# Patient Record
Sex: FEMALE | Race: WHITE | NOT HISPANIC OR LATINO | URBAN - METROPOLITAN AREA
[De-identification: names, ages, dates, MRNs, and addresses within clinical notes are randomized per-mention and may not be internally consistent; named-entity substitution may affect disease eponyms.]

---

## 2017-11-08 ENCOUNTER — EMERGENCY (EMERGENCY)
Facility: HOSPITAL | Age: 27
LOS: 1 days | Discharge: HOME | End: 2017-11-08
Attending: EMERGENCY MEDICINE | Admitting: EMERGENCY MEDICINE
Payer: COMMERCIAL

## 2017-11-08 VITALS
HEART RATE: 82 BPM | RESPIRATION RATE: 18 BRPM | DIASTOLIC BLOOD PRESSURE: 64 MMHG | TEMPERATURE: 98 F | OXYGEN SATURATION: 99 % | SYSTOLIC BLOOD PRESSURE: 121 MMHG

## 2017-11-08 VITALS
DIASTOLIC BLOOD PRESSURE: 78 MMHG | RESPIRATION RATE: 18 BRPM | TEMPERATURE: 98 F | OXYGEN SATURATION: 99 % | WEIGHT: 149.91 LBS | SYSTOLIC BLOOD PRESSURE: 119 MMHG | HEART RATE: 93 BPM | HEIGHT: 64 IN

## 2017-11-08 DIAGNOSIS — R87.619 UNSPECIFIED ABNORMAL CYTOLOGICAL FINDINGS IN SPECIMENS FROM CERVIX UTERI: Chronic | ICD-10-CM

## 2017-11-08 DIAGNOSIS — I62.00 NONTRAUMATIC SUBDURAL HEMORRHAGE, UNSPECIFIED: ICD-10-CM

## 2017-11-08 DIAGNOSIS — Z79.899 OTHER LONG TERM (CURRENT) DRUG THERAPY: ICD-10-CM

## 2017-11-08 DIAGNOSIS — R42 DIZZINESS AND GIDDINESS: ICD-10-CM

## 2017-11-08 DIAGNOSIS — Z88.0 ALLERGY STATUS TO PENICILLIN: ICD-10-CM

## 2017-11-08 LAB
ALBUMIN SERPL ELPH-MCNC: 5.2 G/DL — HIGH (ref 3.3–5)
ALP SERPL-CCNC: 47 U/L — SIGNIFICANT CHANGE UP (ref 40–120)
ALT FLD-CCNC: 23 U/L — SIGNIFICANT CHANGE UP (ref 10–45)
ANION GAP SERPL CALC-SCNC: 16 MMOL/L — SIGNIFICANT CHANGE UP (ref 5–17)
APTT BLD: 27.1 SEC — LOW (ref 27.5–37.4)
AST SERPL-CCNC: 36 U/L — SIGNIFICANT CHANGE UP (ref 10–40)
BASOPHILS NFR BLD AUTO: 0.3 % — SIGNIFICANT CHANGE UP (ref 0–2)
BILIRUB SERPL-MCNC: 0.4 MG/DL — SIGNIFICANT CHANGE UP (ref 0.2–1.2)
BUN SERPL-MCNC: 16 MG/DL — SIGNIFICANT CHANGE UP (ref 7–23)
CALCIUM SERPL-MCNC: 10.2 MG/DL — SIGNIFICANT CHANGE UP (ref 8.4–10.5)
CHLORIDE SERPL-SCNC: 98 MMOL/L — SIGNIFICANT CHANGE UP (ref 96–108)
CO2 SERPL-SCNC: 25 MMOL/L — SIGNIFICANT CHANGE UP (ref 22–31)
CREAT SERPL-MCNC: 1.06 MG/DL — SIGNIFICANT CHANGE UP (ref 0.5–1.3)
EOSINOPHIL NFR BLD AUTO: 0.6 % — SIGNIFICANT CHANGE UP (ref 0–6)
GLUCOSE SERPL-MCNC: 104 MG/DL — HIGH (ref 70–99)
HCT VFR BLD CALC: 37 % — SIGNIFICANT CHANGE UP (ref 34.5–45)
HGB BLD-MCNC: 12.1 G/DL — SIGNIFICANT CHANGE UP (ref 11.5–15.5)
INR BLD: 1.05 — SIGNIFICANT CHANGE UP (ref 0.88–1.16)
LYMPHOCYTES # BLD AUTO: 21.9 % — SIGNIFICANT CHANGE UP (ref 13–44)
MCHC RBC-ENTMCNC: 20.6 PG — LOW (ref 27–34)
MCHC RBC-ENTMCNC: 32.7 G/DL — SIGNIFICANT CHANGE UP (ref 32–36)
MCV RBC AUTO: 62.9 FL — LOW (ref 80–100)
MONOCYTES NFR BLD AUTO: 6.6 % — SIGNIFICANT CHANGE UP (ref 2–14)
NEUTROPHILS NFR BLD AUTO: 70.6 % — SIGNIFICANT CHANGE UP (ref 43–77)
PLATELET # BLD AUTO: 217 K/UL — SIGNIFICANT CHANGE UP (ref 150–400)
POTASSIUM SERPL-MCNC: 3.9 MMOL/L — SIGNIFICANT CHANGE UP (ref 3.5–5.3)
POTASSIUM SERPL-SCNC: 3.9 MMOL/L — SIGNIFICANT CHANGE UP (ref 3.5–5.3)
PROT SERPL-MCNC: 8.5 G/DL — HIGH (ref 6–8.3)
PROTHROM AB SERPL-ACNC: 11.7 SEC — SIGNIFICANT CHANGE UP (ref 9.8–12.7)
RBC # BLD: 5.88 M/UL — HIGH (ref 3.8–5.2)
RBC # FLD: 16.7 % — SIGNIFICANT CHANGE UP (ref 10.3–16.9)
SODIUM SERPL-SCNC: 139 MMOL/L — SIGNIFICANT CHANGE UP (ref 135–145)
WBC # BLD: 6.5 K/UL — SIGNIFICANT CHANGE UP (ref 3.8–10.5)
WBC # FLD AUTO: 6.5 K/UL — SIGNIFICANT CHANGE UP (ref 3.8–10.5)

## 2017-11-08 PROCEDURE — 99283 EMERGENCY DEPT VISIT LOW MDM: CPT

## 2017-11-08 PROCEDURE — 84702 CHORIONIC GONADOTROPIN TEST: CPT

## 2017-11-08 PROCEDURE — 85610 PROTHROMBIN TIME: CPT

## 2017-11-08 PROCEDURE — 80053 COMPREHEN METABOLIC PANEL: CPT

## 2017-11-08 PROCEDURE — 70450 CT HEAD/BRAIN W/O DYE: CPT | Mod: 26

## 2017-11-08 PROCEDURE — 85730 THROMBOPLASTIN TIME PARTIAL: CPT

## 2017-11-08 PROCEDURE — 36415 COLL VENOUS BLD VENIPUNCTURE: CPT

## 2017-11-08 PROCEDURE — 85025 COMPLETE CBC W/AUTO DIFF WBC: CPT

## 2017-11-08 PROCEDURE — 86850 RBC ANTIBODY SCREEN: CPT

## 2017-11-08 PROCEDURE — 70450 CT HEAD/BRAIN W/O DYE: CPT

## 2017-11-08 PROCEDURE — 99291 CRITICAL CARE FIRST HOUR: CPT | Mod: 25

## 2017-11-08 PROCEDURE — 99284 EMERGENCY DEPT VISIT MOD MDM: CPT | Mod: 25

## 2017-11-08 PROCEDURE — 86900 BLOOD TYPING SEROLOGIC ABO: CPT

## 2017-11-08 PROCEDURE — 86901 BLOOD TYPING SEROLOGIC RH(D): CPT

## 2017-11-08 PROCEDURE — 93010 ELECTROCARDIOGRAM REPORT: CPT

## 2017-11-08 RX ORDER — SENNA PLUS 8.6 MG/1
2 TABLET ORAL AT BEDTIME
Qty: 0 | Refills: 0 | Status: DISCONTINUED | OUTPATIENT
Start: 2017-11-08 | End: 2017-11-12

## 2017-11-08 RX ORDER — DOCUSATE SODIUM 100 MG
100 CAPSULE ORAL THREE TIMES A DAY
Qty: 0 | Refills: 0 | Status: DISCONTINUED | OUTPATIENT
Start: 2017-11-08 | End: 2017-11-12

## 2017-11-08 RX ORDER — BUPROPION HYDROCHLORIDE 150 MG/1
300 TABLET, EXTENDED RELEASE ORAL DAILY
Qty: 0 | Refills: 0 | Status: DISCONTINUED | OUTPATIENT
Start: 2017-11-08 | End: 2017-11-12

## 2017-11-08 RX ORDER — SODIUM CHLORIDE 9 MG/ML
1000 INJECTION INTRAMUSCULAR; INTRAVENOUS; SUBCUTANEOUS
Qty: 0 | Refills: 0 | Status: DISCONTINUED | OUTPATIENT
Start: 2017-11-08 | End: 2017-11-12

## 2017-11-08 RX ORDER — BUPROPION HYDROCHLORIDE 150 MG/1
1 TABLET, EXTENDED RELEASE ORAL
Qty: 0 | Refills: 0 | COMMUNITY

## 2017-11-08 NOTE — ED ADULT NURSE NOTE - OBJECTIVE STATEMENT
Patient sent in after abnormal brain MRI. Patient states she has been feeling tingling in her extremities after recent concussion. Patient moving all extremities, no facial droop, pupils 3mm PEERLA. Patient placed on cardiac monitor, EKG performed. Will continue to monitor patient.

## 2017-11-08 NOTE — H&P ADULT - NSHPREVIEWOFSYSTEMS_GEN_ALL_CORE
CV:  RRR  Pulm: Lungs CTAB  Respirations regular and unlabored NAD  PV: + peripheal puse warm to touch + capillary refill  GI: Abdomen round soft +BS non tender  : Voiding without difficulty  Skin: warm and dry  Neuro A&OX3 Cranial nerves intact  COLE 5/5 no drift or dysmetria

## 2017-11-08 NOTE — ED PROVIDER NOTE - MEDICAL DECISION MAKING DETAILS
here w/ known subdural, worsened on repeat MRI yesterday, sent in for nsurg evaluation. here w/ known subdural, worsened on repeat MRI yesterday, sent in for nsurg evaluation. seen by neurosurgery, had CT head, images compared and stable. pt with no new symptoms from yesterday to today. Plan for dc with follow up in 2 days w/ Dr. Gonzalez of neurosurgery. DC home in NAD with strict return precautions given w/ family.

## 2017-11-08 NOTE — CONSULT NOTE ADULT - SUBJECTIVE AND OBJECTIVE BOX
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 Denies LOC. Went to see her Sports MD who did not do any imaging at the time told her to rest for a few weeks. Pt flew to [facility] [place] [facility] for a family visit and noted increase in headaches and feeling gneralized weakness. went to ER and Head CT revealed may have sudural hygromas with balannced mass effect on the underlying brain pareanchyma, measuring up to 7mm bilaterally. No midline shift. Pt was advised not to fly for a week in which she istened to and now returns home. Dr Pete, Neirologist she consulted and sent her to MRI Brain which revealedsubdural collections along the bilateral anterior frontal convexities measuring up to 17mm in thickness on the right, ad up to 10mm in maximal thickness on the left. Acute/Subacute  Advised to come to [facility] ER and e evaluated by Dr Gonzalez  Denies any LOC,Sz activity or change in Menntal status    PAST MEDICAL & SURGICAL HISTORY:  SDH (subdural hematoma)  Depression  Cervical cytology finding    FAMILY HISTORY:  denies    Allergies    penicillins (Unknown)    Intolerances        REVIEW OF SYSTEMS  see HPI      MEDICATIONS:  Antibiotics:    Neuro:  buPROPion XL . 300 milliGRAM(s) Oral daily    Anticoagulation:    OTHER:  docusate sodium 100 milliGRAM(s) Oral three times a day  senna 2 Tablet(s) Oral at bedtime    IVF:  sodium chloride 0.9%. 1000 milliLiter(s) IV Continuous <Continuous>      Vital Signs Last 24 Hrs  T(C): 36.7 (08 Nov 2017 12:38), Max: 36.7 (08 Nov 2017 12:38)  T(F): 98.1 (08 Nov 2017 12:38), Max: 98.1 (08 Nov 2017 12:38)  HR: 86 (08 Nov 2017 13:00) (86 - 93)  BP: 120/63 (08 Nov 2017 13:00) (119/78 - 120/63)  BP(mean): --  RR: 18 (08 Nov 2017 13:00) (18 - 18)  SpO2: 100% (08 Nov 2017 13:00) (99% - 100%)    PHYSICAL EXAM:    CV:  RRR  Pulm: Lungs CTAB  Respirations regular and unlabored NAD  PV: + peripheal puse warm to touch + capillary refill  GI: Abdomen round soft +BS non tender  : Voiding without difficulty  Skin: warm and dry  Neuro A&OX3 Cranial nerves intact   COLE 5/5 no drift or dysmetria	        LABS:                        12.1   6.5   )-----------( 217      ( 08 Nov 2017 12:59 )             37.0     11-08    139  |  98  |  16  ----------------------------<  104<H>  3.9   |  25  |  1.06    Ca    10.2      08 Nov 2017 12:59    TPro  8.5<H>  /  Alb  5.2<H>  /  TBili  0.4  /  DBili  x   /  AST  36  /  ALT  23  /  AlkPhos  47  11-08    PT/INR - ( 08 Nov 2017 12:59 )   PT: 11.7 sec;   INR: 1.05          PTT - ( 08 Nov 2017 12:59 )  PTT:27.1 sec    CULTURES:      RADIOLOGY & ADDITIONAL STUDIES:  < from: CT Head No Cont (11.08.17 @ 14:26) >  The CT examination demonstrates bifrontal and extending along   the parafalcine region. These extra-axial collections are mixed   hyperdense and low density collections with increased attenuation   layering superior/posteriorly. There is thickeningof the dura   appreciated inferiorly. Septations are better appreciated on the prior   MR. Allowing for differences in technique say appear stable in size   measuring 8 mm on the right and 7 mm on the left measured on image 12,   series 2 and on the prior study image 13, series 4.  The tiny right   parietal extra-axial subdural collection is not well seen on this study.  There is no evidence of mass effect or midline shift. There is no   evidence of hydrocephalus. There is a prominent cisterna magna.    < end of copied text >

## 2017-11-08 NOTE — ED PROVIDER NOTE - PHYSICAL EXAMINATION
CONSTITUTIONAL: Well-appearing; well-nourished; in no apparent distress.   HEAD: Normocephalic; atraumatic.   EYES:  conjunctiva and sclera clear  ENT: normal nose; no rhinorrhea; normal pharynx with no erythema or lesions.   NECK: Supple; non-tender;   CARDIOVASCULAR: Normal S1, S2; no murmurs, rubs, or gallops. Regular rate and rhythm.   RESPIRATORY: Breathing easily; breath sounds clear and equal bilaterally; no wheezes, rhonchi, or rales.  GI: Soft; non-distended; non-tender; no palpable organomegaly.   EXT: No cyanosis or edema; N/V intact  SKIN: Normal for age and race; warm; dry; good turgor; no apparent lesions or rash.   NEURO: A & O x 3; face symmetric; grossly unremarkable.   PSYCHOLOGICAL: The patient’s mood and manner are appropriate. CONSTITUTIONAL: Well-appearing; well-nourished; in no apparent distress.   HEAD: Normocephalic; atraumatic.   EYES:  conjunctiva and sclera clear  ENT: normal nose; no rhinorrhea; normal pharynx with no erythema or lesions.   NECK: Supple; non-tender;   CARDIOVASCULAR: Normal S1, S2; no murmurs, rubs, or gallops. Regular rate and rhythm.   RESPIRATORY: Breathing easily; breath sounds clear and equal bilaterally; no wheezes, rhonchi, or rales.  GI: Soft; non-distended; non-tender; no palpable organomegaly.   EXT: No cyanosis or edema; N/V intact  SKIN: Normal for age and race; warm; dry; good turgor; no apparent lesions or rash.   NEURO: A & O x 3; face symmetric; grossly unremarkable. cn exam normal. motor/sensory intact in all extremities. gait intact.  PSYCHOLOGICAL: The patient’s mood and manner are appropriate.

## 2017-11-08 NOTE — H&P ADULT - HISTORY OF PRESENT ILLNESS
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 Denies LOC. Went to see her Sports MD who did not do any imaging at the time told her to rest for a few weeks. Pt flew to [facility] [place] [facility] for a family visit and noted increase in headaches and feeling gneralized weakness. went to ER and Head CT revealed may have sudural hygromas with balannced mass effect on the underlying brain pareanchyma, measuring up to 7mm bilaterally. No midline shift. Pt was advised not to fly for a week in which she istened to and now returns home. Dr Pete, Neirologist she consulted and sent her to MRI Brain which revealedsubdural collections along the bilateral anterior frontal convexities measuring up to 17mm in thickness on the right, ad up to 10mm in maximal thickness on the left. Acute/Subacute  Advised to come to [facility] ER and e evaluated by Dr Gonzalez  Denies any LOC,Sz activity or change in Menntal status

## 2017-11-08 NOTE — ED PROVIDER NOTE - CRITICAL CARE PROVIDED
additional history taking/interpretation of diagnostic studies/consult w/ pt's family directly relating to pts condition/direct patient care (not related to procedure)/consultation with other physicians/documentation

## 2017-11-08 NOTE — ED ADULT TRIAGE NOTE - CHIEF COMPLAINT QUOTE
Pt directed to ED for Neuro Sx admission and CO Dizziness.  Pt states "I had a concussion in October and my dizziness is getting worse, so I had an MRI yesterday and today I was called because there's fresh blood on the image."  Pt ambulating with steady gait, speaking in full sentences.  PT denies N/V/D, SOB, Fevers at this time.

## 2017-11-08 NOTE — ED PROVIDER NOTE - PROGRESS NOTE DETAILS
Neurosurgeon Dr. Gonzalez at bedside talking to patient. Already reviewed MRI from yesterday and CT from today - stable, no new blood.

## 2017-11-08 NOTE — H&P ADULT - PROBLEM SELECTOR PLAN 1
Admit SDU Neurosurgery  Monitor neuro status  F/U Repeat Head CTPain Managmet  F/U Neurlogy consult  Continue wellbutrin  No NSAIDS or blood thinners at this time  F/U Labs  UA  Medical Consult

## 2017-11-08 NOTE — CONSULT NOTE ADULT - ASSESSMENT
28yo female w/ bifrontal small subacute SDH  Patient was seen by  in ER.   CTH stable comparing to yesterday's MRI. PT denies severe HA.  Plan for patient to obtain 1st MRI and followup as outpatient to compare first MRI to yesterdays and today's CTH  No acute NS intervention at this time  Pt should restrain from strenuous activity in the meantime and avoid blood thinners.   D/W

## 2017-11-08 NOTE — CONSULT NOTE ADULT - ATTENDING COMMENTS
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  Pt non focal with mild headache and vague complaints not clearly referable to subdural.  HCT shows stable chronic subdural collections with minimal mass effet similar to MR from 11/7.  [place] MR not available.  Plan to dc to home and follow up as outpatient on Friday when patient obtains her outside MR.  Need to compare [place] MR to current image to assess interval growth before deciding to intervene.  Discussed with patient at length.

## 2017-11-08 NOTE — ED PROVIDER NOTE - OBJECTIVE STATEMENT
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 Pt was advised not to fly for a week in which she istened to and now returns home. Dr Pete, neurologist she consulted and sent her to MRI Brain which revealed subdural collections along the bilateral anterior frontal convexities measuring up to 17mm in thickness on the right, ad up to 10mm in maximal thickness on the left. Acute/Subacute bleeding, was advised to come to [facility] ER and be evaluated by neurosurgery for stability vs intervention, dr gutierrez was made aware by the neurologist. Pt states no LOC, seizures, additional trauma. No N/V. No vision changes.

## 2017-11-17 ENCOUNTER — APPOINTMENT (OUTPATIENT)
Dept: NEUROSURGERY | Facility: CLINIC | Age: 27
End: 2017-11-17
Payer: COMMERCIAL

## 2017-11-17 DIAGNOSIS — Z86.59 PERSONAL HISTORY OF OTHER MENTAL AND BEHAVIORAL DISORDERS: ICD-10-CM

## 2017-11-17 DIAGNOSIS — Z78.9 OTHER SPECIFIED HEALTH STATUS: ICD-10-CM

## 2017-11-17 PROBLEM — Z00.00 ENCOUNTER FOR PREVENTIVE HEALTH EXAMINATION: Status: ACTIVE | Noted: 2017-11-17

## 2017-11-17 PROCEDURE — 99214 OFFICE O/P EST MOD 30 MIN: CPT

## 2017-11-21 ENCOUNTER — RECORD ABSTRACTING (OUTPATIENT)
Age: 27
End: 2017-11-21

## 2017-11-21 VITALS — BODY MASS INDEX: 25.59 KG/M2 | HEIGHT: 63.98 IN | WEIGHT: 149.91 LBS

## 2017-11-21 PROBLEM — Z86.59 HISTORY OF DEPRESSION: Status: RESOLVED | Noted: 2017-11-21 | Resolved: 2017-11-21

## 2017-11-21 PROBLEM — Z78.9 SOCIAL ALCOHOL USE: Status: ACTIVE | Noted: 2017-11-21

## 2018-01-07 ENCOUNTER — FORM ENCOUNTER (OUTPATIENT)
Age: 28
End: 2018-01-07

## 2018-01-08 ENCOUNTER — OUTPATIENT (OUTPATIENT)
Dept: OUTPATIENT SERVICES | Facility: HOSPITAL | Age: 28
LOS: 1 days | End: 2018-01-08
Payer: COMMERCIAL

## 2018-01-08 DIAGNOSIS — R87.619 UNSPECIFIED ABNORMAL CYTOLOGICAL FINDINGS IN SPECIMENS FROM CERVIX UTERI: Chronic | ICD-10-CM

## 2018-01-08 PROCEDURE — 70551 MRI BRAIN STEM W/O DYE: CPT

## 2018-01-08 PROCEDURE — 70551 MRI BRAIN STEM W/O DYE: CPT | Mod: 26

## 2018-02-01 ENCOUNTER — APPOINTMENT (OUTPATIENT)
Dept: NEUROSURGERY | Facility: CLINIC | Age: 28
End: 2018-02-01
Payer: COMMERCIAL

## 2018-02-01 VITALS
DIASTOLIC BLOOD PRESSURE: 67 MMHG | HEIGHT: 63 IN | SYSTOLIC BLOOD PRESSURE: 109 MMHG | WEIGHT: 152 LBS | HEART RATE: 74 BPM | OXYGEN SATURATION: 99 % | BODY MASS INDEX: 26.93 KG/M2

## 2018-02-01 PROCEDURE — 99214 OFFICE O/P EST MOD 30 MIN: CPT

## 2018-03-19 ENCOUNTER — LABORATORY RESULT (OUTPATIENT)
Age: 28
End: 2018-03-19

## 2018-03-19 ENCOUNTER — APPOINTMENT (OUTPATIENT)
Dept: NEUROLOGY | Facility: CLINIC | Age: 28
End: 2018-03-19
Payer: COMMERCIAL

## 2018-03-19 VITALS
HEART RATE: 71 BPM | BODY MASS INDEX: 24.75 KG/M2 | WEIGHT: 145 LBS | TEMPERATURE: 98.9 F | DIASTOLIC BLOOD PRESSURE: 73 MMHG | SYSTOLIC BLOOD PRESSURE: 118 MMHG | OXYGEN SATURATION: 99 % | HEIGHT: 64 IN

## 2018-03-19 PROCEDURE — 99205 OFFICE O/P NEW HI 60 MIN: CPT

## 2018-03-19 RX ORDER — BUPROPION HYDROCHLORIDE 300 MG/1
300 TABLET, EXTENDED RELEASE ORAL DAILY
Refills: 0 | Status: ACTIVE | COMMUNITY

## 2018-03-21 ENCOUNTER — MOBILE ON CALL (OUTPATIENT)
Age: 28
End: 2018-03-21

## 2018-04-17 ENCOUNTER — LABORATORY RESULT (OUTPATIENT)
Age: 28
End: 2018-04-17

## 2018-04-17 ENCOUNTER — APPOINTMENT (OUTPATIENT)
Dept: HEMATOLOGY ONCOLOGY | Facility: CLINIC | Age: 28
End: 2018-04-17
Payer: COMMERCIAL

## 2018-04-17 VITALS
SYSTOLIC BLOOD PRESSURE: 112 MMHG | BODY MASS INDEX: 25.61 KG/M2 | RESPIRATION RATE: 14 BRPM | WEIGHT: 150 LBS | HEART RATE: 75 BPM | OXYGEN SATURATION: 99 % | TEMPERATURE: 98.4 F | DIASTOLIC BLOOD PRESSURE: 70 MMHG | HEIGHT: 64 IN

## 2018-04-17 DIAGNOSIS — Z80.3 FAMILY HISTORY OF MALIGNANT NEOPLASM OF BREAST: ICD-10-CM

## 2018-04-17 DIAGNOSIS — Z82.49 FAMILY HISTORY OF ISCHEMIC HEART DISEASE AND OTHER DISEASES OF THE CIRCULATORY SYSTEM: ICD-10-CM

## 2018-04-17 DIAGNOSIS — D50.9 IRON DEFICIENCY ANEMIA, UNSPECIFIED: ICD-10-CM

## 2018-04-17 PROCEDURE — 36415 COLL VENOUS BLD VENIPUNCTURE: CPT

## 2018-04-17 PROCEDURE — 99214 OFFICE O/P EST MOD 30 MIN: CPT | Mod: 25

## 2018-04-18 LAB
BASOPHILS # BLD AUTO: 0.15 K/UL
BASOPHILS NFR BLD AUTO: 1.8 %
CRP SERPL-MCNC: <0.2 MG/DL
DSDNA AB SER-ACNC: <12 IU/ML
EOSINOPHIL # BLD AUTO: 0.08 K/UL
EOSINOPHIL NFR BLD AUTO: 0.9 %
ERYTHROCYTE [SEDIMENTATION RATE] IN BLOOD BY WESTERGREN METHOD: 7 MM/HR
FERRITIN SERPL-MCNC: 16 NG/ML
HAPTOGLOB SERPL-MCNC: 25 MG/DL
HCT VFR BLD CALC: 35.7 %
HGB BLD-MCNC: 11.8 G/DL
IRON SATN MFR SERPL: 27 %
IRON SERPL-MCNC: 81 UG/DL
LDH SERPL-CCNC: 225 U/L
LYMPHOCYTES # BLD AUTO: 2.18 K/UL
LYMPHOCYTES NFR BLD AUTO: 25.9 %
MAN DIFF?: NORMAL
MCHC RBC-ENTMCNC: 20.9 PG
MCHC RBC-ENTMCNC: 33.1 GM/DL
MCV RBC AUTO: 63.3 FL
MONOCYTES # BLD AUTO: 0.38 K/UL
MONOCYTES NFR BLD AUTO: 4.5 %
NEUTROPHILS # BLD AUTO: 5.33 K/UL
NEUTROPHILS NFR BLD AUTO: 63.4 %
PLATELET # BLD AUTO: 250 K/UL
RBC # BLD: 5.64 M/UL
RBC # FLD: 16.8 %
RHEUMATOID FACT SER QL: <7 IU/ML
TIBC SERPL-MCNC: 300 UG/DL
UIBC SERPL-MCNC: 219 UG/DL
WBC # FLD AUTO: 8.41 K/UL

## 2018-04-21 LAB
ANA PAT FLD IF-IMP: NORMAL
ANA SER IF-ACNC: ABNORMAL
HGB A MFR BLD: 94.6 %
HGB A2 MFR BLD: 4.9 %
HGB F MFR BLD: 0.5 %
HGB FRACT BLD-IMP: NORMAL

## 2018-05-03 ENCOUNTER — APPOINTMENT (OUTPATIENT)
Dept: RHEUMATOLOGY | Facility: CLINIC | Age: 28
End: 2018-05-03
Payer: COMMERCIAL

## 2018-05-03 ENCOUNTER — LABORATORY RESULT (OUTPATIENT)
Age: 28
End: 2018-05-03

## 2018-05-03 VITALS
HEART RATE: 67 BPM | OXYGEN SATURATION: 100 % | WEIGHT: 152 LBS | DIASTOLIC BLOOD PRESSURE: 72 MMHG | BODY MASS INDEX: 25.95 KG/M2 | SYSTOLIC BLOOD PRESSURE: 111 MMHG | HEIGHT: 64 IN

## 2018-05-03 DIAGNOSIS — R53.83 OTHER FATIGUE: ICD-10-CM

## 2018-05-03 DIAGNOSIS — R76.8 OTHER SPECIFIED ABNORMAL IMMUNOLOGICAL FINDINGS IN SERUM: ICD-10-CM

## 2018-05-03 PROCEDURE — 99205 OFFICE O/P NEW HI 60 MIN: CPT | Mod: 25

## 2018-05-03 PROCEDURE — 36415 COLL VENOUS BLD VENIPUNCTURE: CPT

## 2018-05-16 LAB
B2 GLYCOPROT1 IGA SERPL IA-ACNC: <5 SAU
B2 GLYCOPROT1 IGG SER-ACNC: <5 SGU
B2 GLYCOPROT1 IGM SER-ACNC: <5 SMU
CARDIOLIPIN IGM SER-MCNC: <5 GPL
CARDIOLIPIN IGM SER-MCNC: <5 MPL
CONFIRM: 25.8 SEC
DEPRECATED CARDIOLIPIN IGA SER: <5 APL
DRVVT IMM 1:2 NP PPP: NORMAL
DRVVT SCREEN TO CONFIRM RATIO: 0.85 RATIO
DSDNA AB SER-ACNC: <12 IU/ML
ENA RNP AB SER IA-ACNC: <0.2 AL
ENA SCL70 IGG SER IA-ACNC: 0.5 AL
ENA SM AB SER IA-ACNC: <0.2 AL
ENA SS-A AB SER IA-ACNC: <0.2 AL
ENA SS-B AB SER IA-ACNC: <0.2 AL
SCREEN DRVVT: 27 SEC
SILICA CLOTTING TIME INTERPRETATION: NORMAL
SILICA CLOTTING TIME S/C: 0.91 RATIO
THYROGLOB AB SERPL-ACNC: <20 IU/ML
THYROPEROXIDASE AB SERPL IA-ACNC: <10 IU/ML
TSH SERPL-ACNC: 1.28 UIU/ML

## 2018-07-11 ENCOUNTER — OTHER (OUTPATIENT)
Age: 28
End: 2018-07-11

## 2018-07-11 LAB
25(OH)D3 SERPL-MCNC: 55.8 NG/ML
ALBUMIN SERPL ELPH-MCNC: 5 G/DL
ALP BLD-CCNC: 46 U/L
ALT SERPL-CCNC: 18 U/L
ANION GAP SERPL CALC-SCNC: 16 MMOL/L
AST SERPL-CCNC: 27 U/L
BASOPHILS NFR BLD AUTO: 0.9 %
BILIRUB SERPL-MCNC: 0.4 MG/DL
BUN SERPL-MCNC: 19 MG/DL
CALCIUM SERPL-MCNC: 9.9 MG/DL
CHLORIDE SERPL-SCNC: 100 MMOL/L
CO2 SERPL-SCNC: 25 MMOL/L
CREAT SERPL-MCNC: 1.03 MG/DL
EOSINOPHIL NFR BLD AUTO: 0.9 %
FOLATE SERPL-MCNC: >20 NG/ML
GLUCOSE SERPL-MCNC: 81 MG/DL
HCT VFR BLD CALC: 35.6 %
HGB BLD-MCNC: 11.2 G/DL
INR PPP: 1.03 RATIO
LYMPHOCYTES # BLD AUTO: 1.69 K/UL
LYMPHOCYTES NFR BLD AUTO: 21.6 %
MAN DIFF?: NORMAL
MCHC RBC-ENTMCNC: 20.5 PG
MCHC RBC-ENTMCNC: 31.5 GM/DL
MCV RBC AUTO: 65.1 FL
MONOCYTES NFR BLD AUTO: 1.8 %
NEUTROPHILS # BLD AUTO: 5.86 K/UL
NEUTROPHILS NFR BLD AUTO: 74.8 %
PLATELET # BLD AUTO: 212 K/UL
POTASSIUM SERPL-SCNC: 4.5 MMOL/L
PROT SERPL-MCNC: 7.7 G/DL
PT BLD: 11.6 SEC
RBC # BLD: 5.47 M/UL
RBC # FLD: 16.8 %
SODIUM SERPL-SCNC: 141 MMOL/L
TSH SERPL-ACNC: 3.34 UIU/ML
VIT B12 SERPL-MCNC: 886 PG/ML
WBC # FLD AUTO: 7.84 K/UL

## 2018-07-17 PROBLEM — I62.00 NONTRAUMATIC SUBDURAL HEMORRHAGE, UNSPECIFIED: Chronic | Status: ACTIVE | Noted: 2017-11-08

## 2018-07-17 PROBLEM — F32.9 MAJOR DEPRESSIVE DISORDER, SINGLE EPISODE, UNSPECIFIED: Chronic | Status: ACTIVE | Noted: 2017-11-08

## 2018-07-21 ENCOUNTER — APPOINTMENT (OUTPATIENT)
Dept: MRI IMAGING | Facility: CLINIC | Age: 28
End: 2018-07-21
Payer: COMMERCIAL

## 2018-07-21 ENCOUNTER — OUTPATIENT (OUTPATIENT)
Dept: OUTPATIENT SERVICES | Facility: HOSPITAL | Age: 28
LOS: 1 days | End: 2018-07-21
Payer: COMMERCIAL

## 2018-07-21 DIAGNOSIS — R87.619 UNSPECIFIED ABNORMAL CYTOLOGICAL FINDINGS IN SPECIMENS FROM CERVIX UTERI: Chronic | ICD-10-CM

## 2018-07-21 PROCEDURE — 73590 X-RAY EXAM OF LOWER LEG: CPT | Mod: 26,LT

## 2018-07-21 PROCEDURE — 73590 X-RAY EXAM OF LOWER LEG: CPT

## 2018-07-21 PROCEDURE — 70551 MRI BRAIN STEM W/O DYE: CPT | Mod: 26

## 2018-07-21 PROCEDURE — 70551 MRI BRAIN STEM W/O DYE: CPT

## 2018-07-24 ENCOUNTER — CLINICAL ADVICE (OUTPATIENT)
Age: 28
End: 2018-07-24

## 2018-09-05 ENCOUNTER — APPOINTMENT (OUTPATIENT)
Dept: NEUROLOGY | Facility: CLINIC | Age: 28
End: 2018-09-05
Payer: COMMERCIAL

## 2018-09-05 VITALS
SYSTOLIC BLOOD PRESSURE: 107 MMHG | HEIGHT: 64 IN | DIASTOLIC BLOOD PRESSURE: 69 MMHG | WEIGHT: 155 LBS | HEART RATE: 68 BPM | BODY MASS INDEX: 26.46 KG/M2 | OXYGEN SATURATION: 99 % | TEMPERATURE: 98.6 F

## 2018-09-05 DIAGNOSIS — S06.5X9A TRAUMATIC SUBDURAL HEMORRHAGE WITH LOSS OF CONSCIOUSNESS OF UNSPECIFIED DURATION, INITIAL ENCOUNTER: ICD-10-CM

## 2018-09-05 DIAGNOSIS — H53.9 UNSPECIFIED VISUAL DISTURBANCE: ICD-10-CM

## 2018-09-05 PROCEDURE — 99214 OFFICE O/P EST MOD 30 MIN: CPT

## 2018-09-05 RX ORDER — FLUCONAZOLE 200 MG/1
200 TABLET ORAL
Qty: 4 | Refills: 0 | Status: DISCONTINUED | COMMUNITY
Start: 2017-12-12 | End: 2018-09-05

## 2018-09-05 RX ORDER — MUPIROCIN 20 MG/G
2 OINTMENT TOPICAL
Qty: 22 | Refills: 0 | Status: DISCONTINUED | COMMUNITY
Start: 2017-12-12 | End: 2018-09-05

## 2018-09-10 ENCOUNTER — OTHER (OUTPATIENT)
Age: 28
End: 2018-09-10

## 2018-09-17 PROBLEM — S06.5X9A BILATERAL SUBDURAL HEMATOMAS: Status: ACTIVE | Noted: 2017-11-21

## 2018-10-06 ENCOUNTER — APPOINTMENT (OUTPATIENT)
Dept: MRI IMAGING | Facility: CLINIC | Age: 28
End: 2018-10-06
Payer: COMMERCIAL

## 2018-10-06 ENCOUNTER — OUTPATIENT (OUTPATIENT)
Dept: OUTPATIENT SERVICES | Facility: HOSPITAL | Age: 28
LOS: 1 days | End: 2018-10-06
Payer: COMMERCIAL

## 2018-10-06 DIAGNOSIS — R87.619 UNSPECIFIED ABNORMAL CYTOLOGICAL FINDINGS IN SPECIMENS FROM CERVIX UTERI: Chronic | ICD-10-CM

## 2018-10-06 PROCEDURE — 70553 MRI BRAIN STEM W/O & W/DYE: CPT

## 2018-10-06 PROCEDURE — 70553 MRI BRAIN STEM W/O & W/DYE: CPT | Mod: 26

## 2018-12-12 ENCOUNTER — APPOINTMENT (OUTPATIENT)
Dept: NEUROLOGY | Facility: CLINIC | Age: 28
End: 2018-12-12
Payer: COMMERCIAL

## 2018-12-12 VITALS
HEART RATE: 90 BPM | DIASTOLIC BLOOD PRESSURE: 83 MMHG | BODY MASS INDEX: 26.46 KG/M2 | OXYGEN SATURATION: 98 % | HEIGHT: 64 IN | SYSTOLIC BLOOD PRESSURE: 147 MMHG | WEIGHT: 155 LBS

## 2018-12-12 DIAGNOSIS — R42 DIZZINESS AND GIDDINESS: ICD-10-CM

## 2018-12-12 DIAGNOSIS — D56.3 THALASSEMIA MINOR: ICD-10-CM

## 2018-12-12 DIAGNOSIS — S06.0X9A CONCUSSION WITH LOSS OF CONSCIOUSNESS OF UNSPECIFIED DURATION, INITIAL ENCOUNTER: ICD-10-CM

## 2018-12-12 DIAGNOSIS — R51 HEADACHE: ICD-10-CM

## 2018-12-12 PROCEDURE — 99214 OFFICE O/P EST MOD 30 MIN: CPT

## 2019-04-19 ENCOUNTER — OTHER (OUTPATIENT)
Age: 29
End: 2019-04-19

## 2019-06-12 ENCOUNTER — APPOINTMENT (OUTPATIENT)
Dept: NEUROLOGY | Facility: CLINIC | Age: 29
End: 2019-06-12

## 2022-03-07 NOTE — ED ADULT NURSE NOTE - PATIENT DISCHARGE SIGNATURE
08-Nov-2017 Niacinamide Counseling: I recommended taking niacin or niacinamide, also know as vitamin B3, twice daily. Recent evidence suggests that taking vitamin B3 (500 mg twice daily) can reduce the risk of actinic keratoses and non-melanoma skin cancers. Side effects of vitamin B3 include flushing and headache.